# Patient Record
Sex: MALE | Race: WHITE | URBAN - METROPOLITAN AREA
[De-identification: names, ages, dates, MRNs, and addresses within clinical notes are randomized per-mention and may not be internally consistent; named-entity substitution may affect disease eponyms.]

---

## 2024-03-14 ENCOUNTER — APPOINTMENT (OUTPATIENT)
Dept: PRIMARY CARE | Facility: CLINIC | Age: 24
End: 2024-03-14
Payer: COMMERCIAL

## 2024-03-18 ENCOUNTER — OFFICE VISIT (OUTPATIENT)
Dept: PRIMARY CARE | Facility: CLINIC | Age: 24
End: 2024-03-18
Payer: COMMERCIAL

## 2024-03-18 VITALS
TEMPERATURE: 97.6 F | SYSTOLIC BLOOD PRESSURE: 112 MMHG | HEART RATE: 85 BPM | DIASTOLIC BLOOD PRESSURE: 70 MMHG | RESPIRATION RATE: 18 BRPM | BODY MASS INDEX: 35.09 KG/M2 | WEIGHT: 273.4 LBS | OXYGEN SATURATION: 97 % | HEIGHT: 74 IN

## 2024-03-18 DIAGNOSIS — K21.9 GASTROESOPHAGEAL REFLUX DISEASE WITHOUT ESOPHAGITIS: ICD-10-CM

## 2024-03-18 DIAGNOSIS — G47.30 SLEEP APNEA IN ADULT: ICD-10-CM

## 2024-03-18 DIAGNOSIS — R06.83 SNORING: ICD-10-CM

## 2024-03-18 DIAGNOSIS — Z00.00 WELLNESS EXAMINATION: Primary | ICD-10-CM

## 2024-03-18 PROCEDURE — 99385 PREV VISIT NEW AGE 18-39: CPT | Performed by: NURSE PRACTITIONER

## 2024-03-18 PROCEDURE — 1036F TOBACCO NON-USER: CPT | Performed by: NURSE PRACTITIONER

## 2024-03-18 RX ORDER — PANTOPRAZOLE SODIUM 40 MG/1
40 TABLET, DELAYED RELEASE ORAL
Qty: 42 TABLET | Refills: 0 | Status: SHIPPED | OUTPATIENT
Start: 2024-03-18 | End: 2024-04-29 | Stop reason: SDUPTHER

## 2024-03-18 ASSESSMENT — PATIENT HEALTH QUESTIONNAIRE - PHQ9
SUM OF ALL RESPONSES TO PHQ9 QUESTIONS 1 AND 2: 2
1. LITTLE INTEREST OR PLEASURE IN DOING THINGS: SEVERAL DAYS
10. IF YOU CHECKED OFF ANY PROBLEMS, HOW DIFFICULT HAVE THESE PROBLEMS MADE IT FOR YOU TO DO YOUR WORK, TAKE CARE OF THINGS AT HOME, OR GET ALONG WITH OTHER PEOPLE: SOMEWHAT DIFFICULT
2. FEELING DOWN, DEPRESSED OR HOPELESS: SEVERAL DAYS

## 2024-03-18 ASSESSMENT — ENCOUNTER SYMPTOMS
SHORTNESS OF BREATH: 0
VOMITING: 0
CONSTIPATION: 0
FEVER: 0
PALPITATIONS: 0
HEADACHES: 1
DIARRHEA: 0
FATIGUE: 0
WEAKNESS: 0
ABDOMINAL PAIN: 0
BLOOD IN STOOL: 0
COUGH: 0
NAUSEA: 0

## 2024-03-18 NOTE — PROGRESS NOTES
"Subjective   Patient ID: Kyle Lira is a 23 y.o. male who presents for Annual Exam (23 year annual exam. Concerns include heartburn and sleeping issues. ).    HPI Works selling heating/cooling components.  Dating his girlfriend for 6 years, since age 17.   Denies feelings of persistent depression. Denies anxiety.   Sees dentist regularly.   Wears glasses. Last eye doctor 6/2023  Admits to 2-3 headaches per week,states they often resolve after he eats. States he doesn't eat meals on a regular schedule and always skips breakfast and often skips either lung or dinner, stating he isn't very hungry until around noon. They order takeout 2-3 times per week. Eats a significant amount of pasta. He also eats a lot of spicy food. His weight has been stable, and he does do heavy lifting at work.  Review of Systems   Constitutional:  Negative for fatigue and fever.   Respiratory:  Negative for cough and shortness of breath.    Cardiovascular:  Negative for chest pain and palpitations.   Gastrointestinal:  Negative for abdominal pain, blood in stool, constipation, diarrhea, nausea and vomiting.   Genitourinary:  Negative for penile discharge, penile pain, penile swelling, scrotal swelling and testicular pain.   Neurological:  Positive for headaches (2-3 times per week. Sometimes goes away after eating. They are not debiliting..). Negative for weakness.   Objective   /70   Pulse 85   Temp 36.4 °C (97.6 °F)   Resp 18   Ht 1.88 m (6' 2\")   Wt 124 kg (273 lb 6.4 oz)   SpO2 97%   BMI 35.10 kg/m²   Physical Exam  Vitals reviewed.   Constitutional:       Appearance: Normal appearance.   HENT:      Head: Normocephalic.   Eyes:      Conjunctiva/sclera: Conjunctivae normal.   Cardiovascular:      Rate and Rhythm: Normal rate and regular rhythm.      Pulses: Normal pulses.   Pulmonary:      Breath sounds: Normal breath sounds.   Abdominal:      General: Bowel sounds are normal.      Palpations: Abdomen is soft. "   Musculoskeletal:      Cervical back: Neck supple.   Skin:     General: Skin is warm and dry.   Neurological:      General: No focal deficit present.      Mental Status: He is alert and oriented to person, place, and time.   Psychiatric:         Mood and Affect: Mood normal.         Thought Content: Thought content normal.     Assessment/Plan      Problem List Items Addressed This Visit       Gastroesophageal reflux disease without esophagitis - Primary    Relevant Medications    pantoprazole (Protonix) 40 mg EC tablet    Snoring    Relevant Orders    Referral to Adult Sleep Medicine    Sleep apnea in adult    Relevant Orders    Referral to Adult Sleep Medicine     Other Visit Diagnoses       Wellness examination        Relevant Orders    CBC    Thyroid Stimulating Hormone    Lipid Panel    Hemoglobin A1C    Comprehensive Metabolic Panel    Vitamin B12    Vitamin D 1,25 Dihydroxy (for eval of hypercalcemia)

## 2024-03-20 ENCOUNTER — LAB (OUTPATIENT)
Dept: LAB | Facility: LAB | Age: 24
End: 2024-03-20
Payer: COMMERCIAL

## 2024-03-20 DIAGNOSIS — Z00.00 WELLNESS EXAMINATION: ICD-10-CM

## 2024-03-20 LAB
ALBUMIN SERPL BCP-MCNC: 4.8 G/DL (ref 3.4–5)
ALP SERPL-CCNC: 58 U/L (ref 33–120)
ALT SERPL W P-5'-P-CCNC: 42 U/L (ref 10–52)
ANION GAP SERPL CALC-SCNC: 13 MMOL/L (ref 10–20)
AST SERPL W P-5'-P-CCNC: 25 U/L (ref 9–39)
BILIRUB SERPL-MCNC: 1.3 MG/DL (ref 0–1.2)
BUN SERPL-MCNC: 11 MG/DL (ref 6–23)
CALCIUM SERPL-MCNC: 9.6 MG/DL (ref 8.6–10.3)
CHLORIDE SERPL-SCNC: 104 MMOL/L (ref 98–107)
CHOLEST SERPL-MCNC: 145 MG/DL (ref 0–199)
CHOLESTEROL/HDL RATIO: 3.2
CO2 SERPL-SCNC: 28 MMOL/L (ref 21–32)
CREAT SERPL-MCNC: 0.91 MG/DL (ref 0.5–1.3)
EGFRCR SERPLBLD CKD-EPI 2021: >90 ML/MIN/1.73M*2
ERYTHROCYTE [DISTWIDTH] IN BLOOD BY AUTOMATED COUNT: 12.4 % (ref 11.5–14.5)
EST. AVERAGE GLUCOSE BLD GHB EST-MCNC: 108 MG/DL
GLUCOSE SERPL-MCNC: 93 MG/DL (ref 74–99)
HBA1C MFR BLD: 5.4 %
HCT VFR BLD AUTO: 51 % (ref 41–52)
HDLC SERPL-MCNC: 45.5 MG/DL
HGB BLD-MCNC: 17.4 G/DL (ref 13.5–17.5)
LDLC SERPL CALC-MCNC: 71 MG/DL
MCH RBC QN AUTO: 30.4 PG (ref 26–34)
MCHC RBC AUTO-ENTMCNC: 34.1 G/DL (ref 32–36)
MCV RBC AUTO: 89 FL (ref 80–100)
NON HDL CHOLESTEROL: 100 MG/DL (ref 0–149)
NRBC BLD-RTO: 0 /100 WBCS (ref 0–0)
PLATELET # BLD AUTO: 286 X10*3/UL (ref 150–450)
POTASSIUM SERPL-SCNC: 4.7 MMOL/L (ref 3.5–5.3)
PROT SERPL-MCNC: 7.8 G/DL (ref 6.4–8.2)
RBC # BLD AUTO: 5.73 X10*6/UL (ref 4.5–5.9)
SODIUM SERPL-SCNC: 140 MMOL/L (ref 136–145)
TRIGL SERPL-MCNC: 142 MG/DL (ref 0–149)
TSH SERPL-ACNC: 1.04 MIU/L (ref 0.44–3.98)
VIT B12 SERPL-MCNC: 281 PG/ML (ref 211–911)
VLDL: 28 MG/DL (ref 0–40)
WBC # BLD AUTO: 7 X10*3/UL (ref 4.4–11.3)

## 2024-03-20 PROCEDURE — 80061 LIPID PANEL: CPT

## 2024-03-20 PROCEDURE — 83036 HEMOGLOBIN GLYCOSYLATED A1C: CPT

## 2024-03-20 PROCEDURE — 80053 COMPREHEN METABOLIC PANEL: CPT

## 2024-03-20 PROCEDURE — 36415 COLL VENOUS BLD VENIPUNCTURE: CPT

## 2024-03-20 PROCEDURE — 82652 VIT D 1 25-DIHYDROXY: CPT

## 2024-03-20 PROCEDURE — 85027 COMPLETE CBC AUTOMATED: CPT

## 2024-03-20 PROCEDURE — 82607 VITAMIN B-12: CPT

## 2024-03-20 PROCEDURE — 84443 ASSAY THYROID STIM HORMONE: CPT

## 2024-03-22 LAB — 1,25(OH)2D3 SERPL-MCNC: 35.8 PG/ML (ref 19.9–79.3)

## 2024-04-29 ENCOUNTER — OFFICE VISIT (OUTPATIENT)
Dept: PRIMARY CARE | Facility: CLINIC | Age: 24
End: 2024-04-29
Payer: COMMERCIAL

## 2024-04-29 VITALS
RESPIRATION RATE: 20 BRPM | BODY MASS INDEX: 34.79 KG/M2 | TEMPERATURE: 97.7 F | SYSTOLIC BLOOD PRESSURE: 110 MMHG | WEIGHT: 271 LBS | DIASTOLIC BLOOD PRESSURE: 68 MMHG | HEART RATE: 76 BPM

## 2024-04-29 DIAGNOSIS — K21.9 GASTROESOPHAGEAL REFLUX DISEASE WITHOUT ESOPHAGITIS: Primary | ICD-10-CM

## 2024-04-29 DIAGNOSIS — R06.83 SNORING: ICD-10-CM

## 2024-04-29 DIAGNOSIS — G47.30 SLEEP APNEA IN ADULT: ICD-10-CM

## 2024-04-29 DIAGNOSIS — E66.9 OBESITY (BMI 30.0-34.9): ICD-10-CM

## 2024-04-29 PROBLEM — E66.811 OBESITY (BMI 30.0-34.9): Status: ACTIVE | Noted: 2024-04-29

## 2024-04-29 PROCEDURE — 99214 OFFICE O/P EST MOD 30 MIN: CPT | Performed by: NURSE PRACTITIONER

## 2024-04-29 RX ORDER — PANTOPRAZOLE SODIUM 40 MG/1
40 TABLET, DELAYED RELEASE ORAL
Qty: 30 TABLET | Refills: 11 | Status: SHIPPED | OUTPATIENT
Start: 2024-04-29 | End: 2024-04-29 | Stop reason: ENTERED-IN-ERROR

## 2024-04-29 RX ORDER — FAMOTIDINE 20 MG/1
20 TABLET, FILM COATED ORAL 2 TIMES DAILY
Qty: 60 TABLET | Refills: 5 | Status: SHIPPED | OUTPATIENT
Start: 2024-04-29 | End: 2024-10-26

## 2024-04-29 NOTE — ASSESSMENT & PLAN NOTE
Pt. Requesting assistance with weight loss. He admits that he never feels full. He states he always feels hungry and snacks late at night, often with mac and cheese or a pop tart. 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

## 2024-04-29 NOTE — ASSESSMENT & PLAN NOTE
Has appt with sleep medicine in late June, 2024. He has not been formally diagnosed with sleep apnea. He does report that he does not feel rested when he wakes in the morning.

## 2024-04-29 NOTE — ASSESSMENT & PLAN NOTE
Not been confirmed by sleep study, but reports waking up gasping for air. Had several similar instances and one that occurred while awake playing video games.   Has appt w/ sleep medicine 6/2024.

## 2024-04-29 NOTE — PROGRESS NOTES
Subjective   Kyle Lira is a 23 y.o. male who presents for Follow-up (6wk follow up for pantoprazole. Pt says its working. It hasn't completely stopped the heartburn but it helps a lot. ).  HPI Pt states his last episode of heartburn was 5 days ago and occurred while he was sleeping.  He did not take any additional medication and states it went away on it's own. He states symptoms are worse on an empty stomach and improve after he eats. He doesn't drink alcohol very often.   Review of Systems  Objective   Physical Exam  Vitals reviewed.   Constitutional:       General: He is not in acute distress.     Appearance: Normal appearance. He is obese. He is not ill-appearing.   HENT:      Head: Normocephalic.   Eyes:      Conjunctiva/sclera: Conjunctivae normal.   Cardiovascular:      Rate and Rhythm: Normal rate and regular rhythm.      Pulses: Normal pulses.   Pulmonary:      Breath sounds: Normal breath sounds.   Abdominal:      General: Bowel sounds are normal.      Palpations: Abdomen is soft.   Musculoskeletal:      Cervical back: Neck supple.   Skin:     General: Skin is warm and dry.   Neurological:      General: No focal deficit present.      Mental Status: He is alert and oriented to person, place, and time.   Psychiatric:         Mood and Affect: Mood normal.         Thought Content: Thought content normal.     /68   Pulse 76   Temp 36.5 °C (97.7 °F)   Resp 20   Wt 123 kg (271 lb)   BMI 34.79 kg/m²   Assessment/Plan   Problem List Items Addressed This Visit       Gastroesophageal reflux disease without esophagitis - Primary    Relevant Medications    famotidine (Pepcid) 20 mg tablet    Snoring    Current Assessment & Plan     Has appt with sleep medicine in late June, 2024. He has not been formally diagnosed with sleep apnea. He does report that he does not feel rested when he wakes in the morning.          Sleep apnea in adult    Current Assessment & Plan     Not been confirmed by sleep study, but  reports waking up gasping for air. Had several similar instances and one that occurred while awake playing video games.   Has appt w/ sleep medicine 6/2024.          Obesity (BMI 30.0-34.9)    Current Assessment & Plan     Pt. Requesting assistance with weight loss. He admits that he never feels full. He states he always feels hungry and snacks late at night, often with mac and cheese or a pop tart. 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          Relevant Orders    Follow Up In [facility] - Pharmacy    Referral to Nutrition Services

## 2024-05-15 ENCOUNTER — TELEMEDICINE (OUTPATIENT)
Dept: PHARMACY | Facility: HOSPITAL | Age: 24
End: 2024-05-15
Payer: COMMERCIAL

## 2024-05-15 DIAGNOSIS — E66.9 OBESITY (BMI 30.0-34.9): ICD-10-CM

## 2024-05-15 NOTE — PROGRESS NOTES
Subjective   Patient ID: Kyle Lira is a 23 y.o. male who presents for weight loss.    Referring Provider: ELENO Oreilly-CNP     HPI  Starting weight 271lb  BMI 35  Patient also has PMH of sleep apnea.    Review of Systems    Objective     Labs  Lab Results   Component Value Date    BILITOT 1.3 (H) 03/20/2024    CALCIUM 9.6 03/20/2024    CO2 28 03/20/2024     03/20/2024    CREATININE 0.91 03/20/2024    GLUCOSE 93 03/20/2024    ALKPHOS 58 03/20/2024    K 4.7 03/20/2024    PROT 7.8 03/20/2024     03/20/2024    AST 25 03/20/2024    ALT 42 03/20/2024    BUN 11 03/20/2024    ANIONGAP 13 03/20/2024    ALBUMIN 4.8 03/20/2024     Lab Results   Component Value Date    TRIG 142 03/20/2024    CHOL 145 03/20/2024    LDLCALC 71 03/20/2024    HDL 45.5 03/20/2024     Lab Results   Component Value Date    HGBA1C 5.4 03/20/2024     Assessment/Plan   Patient's insurance excludes Wegovy and Zepbound. Ozempic and Mounjaro need a PA. Will submit PA and follow up in 1 week with insurance determination.    PLAN:  Follow up in 1 week    Continue all meds under the continuation of care with the referring provider and clinical pharmacy team.    Rose Contreras, PharmD

## 2024-05-22 ENCOUNTER — TELEMEDICINE (OUTPATIENT)
Dept: PHARMACY | Facility: HOSPITAL | Age: 24
End: 2024-05-22
Payer: COMMERCIAL

## 2024-05-22 DIAGNOSIS — E66.9 OBESITY (BMI 30.0-34.9): ICD-10-CM

## 2024-05-24 NOTE — PROGRESS NOTES
Subjective   Patient ID: Kyle Lira is a 23 y.o. male who presents for weight loss.    Referring Provider: ELENO Oreilly-CNP     HPI  Starting weight 271lb  BMI 35  Patient also has PMH of sleep apnea.    Review of Systems    Objective     Labs  Lab Results   Component Value Date    BILITOT 1.3 (H) 03/20/2024    CALCIUM 9.6 03/20/2024    CO2 28 03/20/2024     03/20/2024    CREATININE 0.91 03/20/2024    GLUCOSE 93 03/20/2024    ALKPHOS 58 03/20/2024    K 4.7 03/20/2024    PROT 7.8 03/20/2024     03/20/2024    AST 25 03/20/2024    ALT 42 03/20/2024    BUN 11 03/20/2024    ANIONGAP 13 03/20/2024    ALBUMIN 4.8 03/20/2024     Lab Results   Component Value Date    TRIG 142 03/20/2024    CHOL 145 03/20/2024    LDLCALC 71 03/20/2024    HDL 45.5 03/20/2024     Lab Results   Component Value Date    HGBA1C 5.4 03/20/2024     Assessment/Plan   Patient's Ozempic PA was denied. Will attempt an appeal and follow up in 3 weeks with determination.    PLAN:  Follow up in 3 weeks    Continue all meds under the continuation of care with the referring provider and clinical pharmacy team.    Rose Contreras, PharmD

## 2024-06-12 ENCOUNTER — APPOINTMENT (OUTPATIENT)
Dept: PHARMACY | Facility: HOSPITAL | Age: 24
End: 2024-06-12
Payer: COMMERCIAL

## 2024-06-13 ENCOUNTER — TELEPHONE (OUTPATIENT)
Dept: PRIMARY CARE | Facility: CLINIC | Age: 24
End: 2024-06-13
Payer: COMMERCIAL

## 2024-06-13 DIAGNOSIS — K21.9 GASTROESOPHAGEAL REFLUX DISEASE WITHOUT ESOPHAGITIS: Primary | ICD-10-CM

## 2024-06-13 NOTE — TELEPHONE ENCOUNTER
PT needs a refill- PT wants to know if her can switch back to...  pantoprazole (Protonix) 40 mg EC tablet [[id number]]  DISCONTINUED   PT states this one works better.    [facility]    Thank you

## 2024-06-19 NOTE — PROGRESS NOTES
Patient: Kyle Lira  : 2000 AGE: 23 y.o. SEX:male   MRN: [id number]   Provider: MARGARITO Carter     Location [facility]   Service Date: 2024     PCP: MARGARITO Oreilly   Referred by: Domi Schmidt APRN-*          [facility]  New Visit Note      HISTORY OF PRESENT ILLNESS     Kyle Lira is a 23 y.o. male with a h/o Obesity and GERD  who presents to [facility].    2024 : NPV, referred by PCP with concerns of GWEN evaluation. Patient reports loud snoring, witnessed apneas, occasional gasping/choking for air, wakes unrefreshed, morning dry mouth, and morning sore throat. Report bothersome acid reflex, likes to eat spicy foods. Girlfriend is bothered by his snoring. ---> HSAT ordered.    SLEEP STUDY HISTORY (personally reviewed raw data such as interpretation report, data sheet, hypnogram, and titration table if available and applicable)  - Has never had sleep study completed     SLEEP-WAKE SCHEDULE    Sleep Patterns: He does have a usual bed partner. In terms of the patient's sleep/wake cycle, he generally gets into bed at approximately 11 PM.  his latency to sleep onset after lights out is usually <5 min. During the night, the patient generally awakens 0-1 times nightly. These awakenings are usually brief in duration but sometimes will be prolonged and difficulty falling back asleep. Final wake time on weekend mornings is around 7 AM.    Compared to weekdays (work week), the patient's sleep schedule is  different on the weekends (off work). Usual bed time on the weekend is around 1 AM. Final wake time on weekend mornings is around 10 AM.    Breathing during sleep: snoring, witnessed apneas, gasping/choking for air, nasal congestion, and nocturnal reflux symptoms  Behaviors at night: No   Sleep paralysis: No   Hypnogogic or hypnopompic hallucinations: No   Cataplexy: No     Leg symptoms and timing:  -  Add 47304 Cpt? (Important Note: In 2017 The Use Of 64303 Is Being Tracked By Cms To Determine Future Global Period Reimbursement For Global Periods): no Sensations: Patient does not have unusual sensations in their extremities that cause an urge to move them     Daytime Symptoms:  On awakening patient reports: wake unrefreshed, feels sleepy, morning dry mouth, morning sore throat, and hard to get out of bed  Patient report some daytime symptoms including: DAYTIME SYMPTOMS: reports sleep inertia late to work/school due to sleepiness  Patient denies daytime symptoms including: Denies: excessive daytime sleepiness irritability during the day difficulty with memory or concentration during the day feeling sleepy when driving    Sleep environment:  Preferred sleep position: side , wakes on back or stomach   Room is dark: Yes  Room is quiet: Yes  Room is cool: Yes  Bed comfort: good    SLEEP HABITS  Caffeine consumption: Yes, 2 cups/day  Alcohol consumption: Yes, rarely (occasional)  Smoking: Yes, vaping nicotine   Marijuana: No  Sleep aids: denies     WEIGHT: stable    ESS: 2  SILVIANO: 8  STOP-BAN (high)    REVIEW OF SYSTEMS     All other systems have been reviewed and are negative.    ALLERGIES     No Known Allergies    MEDICATIONS     Current Outpatient Medications   Medication Sig Dispense Refill    famotidine (Pepcid) 20 mg tablet Take 1 tablet (20 mg) by mouth 2 times a day. (Patient not taking: Reported on 2024) 60 tablet 5     No current facility-administered medications for this visit.       PAST HISTORIES     PERTINENT PAST MEDICAL HISTORY: See HPI    PERTINENT PAST SURGICAL HISTORY for Sleep Medicine:  non-contributory    PERTINENT FAMILY HISTORY for Sleep Medicine:  loud snoring- mother     PERTINENT SOCIAL HISTORY:  He  reports that he has never smoked. He has never used smokeless tobacco. He reports that he does not currently use alcohol. He reports that he does not currently use drugs. 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 No significant changes unless documented in the pertinent chart section. Updates made when necessary.     PHYSICAL EXAM     VITAL SIGNS: /83   Pulse 105   Resp 18   Ht 1.88 m (6' 2\")   Wt 122 kg (268 lb 1.6 oz)   SpO2 97%   BMI 34.42 kg/m²     NECK CIRCUMFERENCE: 47cm    CURRENT WEIGHT:   Vitals:    06/27/24 1411   Weight: 122 kg (268 lb 1.6 oz)      PREVIOUS WEIGHTS:  Wt Readings from Last 3 Encounters:   06/27/24 122 kg (268 lb 1.6 oz)   04/29/24 123 kg (271 lb)   03/18/24 124 kg (273 lb 6.4 oz)     Physical Exam  Constitutional: Awake, not in distress  Lungs: Clear to auscultation bilateral, no cough noted  Heart: Regular rate and rhythm  Skin: Warm, no rash  Neuro: No tremors, moves all extremities  Psych: alert and oriented to time, place, and person    HEENT:   Tonsils enlargement grade 1+   Airway comments: narrow lateral walls   Tongue scalloping: yes   Modified Mallampati score - 3    RESULTS/DATA     No results found for: \"IRON\", \"TRANSFERRIN\", \"IRONSAT\", \"TIBC\", \"FERRITIN\"    Bicarbonate   Date Value Ref Range Status   03/20/2024 28 21 - 32 mmol/L Final       ASSESSMENT/PLAN     Mr. Lira is a 23 y.o. male and He was referred to the [facility] for evaluation of possible sleep disordered breathing    Problem List, Orders, Assessment, Recommendations:    #Sleep disordered breathing/suspected GWEN  - Due to high pre-test probability without significant medical comorbidity or possible concomitant sleep disorder, I recommend home sleep apnea testing to evaluate for GWEN  - Follow up after sleep study to review results and determine plan of care (if normal, would likely proceed with in lab PSG)   -Diet, exercise, and weight loss were emphasized today in clinic, as were non-supine sleep, avoiding alcohol in the late evening, and driving or operating heavy machinery when sleepy.     #GERD  - uncontrolled, educated on GERD precautions  - was previously " on PPI per PCP which worked well but PCP discontinued and referred him to GI?  - defer medication management      #Obesity  BMI Readings from Last 1 Encounters:   06/27/24 34.42 kg/m²     - Encouraged healthy weight loss via diet and exercise  - Weight loss can help in the long term treatment of GWEN.  - Defer management to PCP     #Vaping nicotine  - daily use, encouraged smoking cessation       All of patient's questions were answered. He verbalizes understanding and agreement with my assessment and plan.    Disposition    Return to clinic in 1-1.5  months    Detail Level: Detailed

## 2024-06-26 ENCOUNTER — APPOINTMENT (OUTPATIENT)
Dept: PHARMACY | Facility: HOSPITAL | Age: 24
End: 2024-06-26
Payer: COMMERCIAL

## 2024-06-26 DIAGNOSIS — E66.9 OBESITY (BMI 30.0-34.9): ICD-10-CM

## 2024-06-26 PROBLEM — G47.30 SLEEP DISORDER BREATHING: Status: ACTIVE | Noted: 2024-06-26

## 2024-06-27 ENCOUNTER — APPOINTMENT (OUTPATIENT)
Dept: SLEEP MEDICINE | Facility: CLINIC | Age: 24
End: 2024-06-27
Payer: COMMERCIAL

## 2024-06-27 VITALS
OXYGEN SATURATION: 97 % | HEART RATE: 105 BPM | HEIGHT: 74 IN | RESPIRATION RATE: 18 BRPM | DIASTOLIC BLOOD PRESSURE: 83 MMHG | SYSTOLIC BLOOD PRESSURE: 121 MMHG | WEIGHT: 268.1 LBS | BODY MASS INDEX: 34.41 KG/M2

## 2024-06-27 DIAGNOSIS — F17.290 VAPING NICOTINE DEPENDENCE, TOBACCO PRODUCT: ICD-10-CM

## 2024-06-27 DIAGNOSIS — G47.30 SLEEP DISORDER BREATHING: Primary | ICD-10-CM

## 2024-06-27 DIAGNOSIS — K21.9 GASTROESOPHAGEAL REFLUX DISEASE WITHOUT ESOPHAGITIS: ICD-10-CM

## 2024-06-27 DIAGNOSIS — G47.30 SLEEP APNEA IN ADULT: ICD-10-CM

## 2024-06-27 DIAGNOSIS — E66.09 CLASS 1 OBESITY DUE TO EXCESS CALORIES WITH BODY MASS INDEX (BMI) OF 34.0 TO 34.9 IN ADULT, UNSPECIFIED WHETHER SERIOUS COMORBIDITY PRESENT: ICD-10-CM

## 2024-06-27 DIAGNOSIS — R06.83 SNORING: ICD-10-CM

## 2024-06-27 PROBLEM — E66.811 CLASS 1 OBESITY DUE TO EXCESS CALORIES WITH BODY MASS INDEX (BMI) OF 34.0 TO 34.9 IN ADULT: Status: ACTIVE | Noted: 2024-06-27

## 2024-06-27 PROCEDURE — 99204 OFFICE O/P NEW MOD 45 MIN: CPT | Performed by: NURSE PRACTITIONER

## 2024-06-27 PROCEDURE — 3008F BODY MASS INDEX DOCD: CPT | Performed by: NURSE PRACTITIONER

## 2024-06-27 PROCEDURE — 1036F TOBACCO NON-USER: CPT | Performed by: NURSE PRACTITIONER

## 2024-06-27 ASSESSMENT — SLEEP AND FATIGUE QUESTIONNAIRES
HOW LIKELY ARE YOU TO NOD OFF OR FALL ASLEEP WHILE SITTING AND READING: SLIGHT CHANCE OF DOZING
HOW LIKELY ARE YOU TO NOD OFF OR FALL ASLEEP WHILE SITTING AND TALKING TO SOMEONE: WOULD NEVER DOZE
HOW LIKELY ARE YOU TO NOD OFF OR FALL ASLEEP WHEN YOU ARE A PASSENGER IN A CAR FOR AN HOUR WITHOUT A BREAK: SLIGHT CHANCE OF DOZING
HOW LIKELY ARE YOU TO NOD OFF OR FALL ASLEEP WHILE WATCHING TV: WOULD NEVER DOZE
HOW LIKELY ARE YOU TO NOD OFF OR FALL ASLEEP WHILE LYING DOWN TO REST IN THE AFTERNOON WHEN CIRCUMSTANCES PERMIT: WOULD NEVER DOZE
WORRIED_DISTRESSED_DUE_TO_SLEEP: SOMEWHAT
SLEEP_PROBLEM_NOTICEABLE_TO_OTHERS: A LITTLE
ESS-CHAD TOTAL SCORE: 2
SITING INACTIVE IN A PUBLIC PLACE LIKE A CLASS ROOM OR A MOVIE THEATER: WOULD NEVER DOZE
DIFFICULTY_FALLING_ASLEEP: MILD
HOW LIKELY ARE YOU TO NOD OFF OR FALL ASLEEP WHILE SITTING QUIETLY AFTER LUNCH WITHOUT ALCOHOL: WOULD NEVER DOZE
DIFFICULTY_STAYING_ASLEEP: MILD
HOW LIKELY ARE YOU TO NOD OFF OR FALL ASLEEP IN A CAR, WHILE STOPPED FOR A FEW MINUTES IN TRAFFIC: WOULD NEVER DOZE
SLEEP_PROBLEM_INTERFERES_DAILY_ACTIVITIES: A LITTLE
SATISFACTION_WITH_CURRENT_SLEEP_PATTERN: SATISFIED

## 2024-06-27 ASSESSMENT — COLUMBIA-SUICIDE SEVERITY RATING SCALE - C-SSRS
1. IN THE PAST MONTH, HAVE YOU WISHED YOU WERE DEAD OR WISHED YOU COULD GO TO SLEEP AND NOT WAKE UP?: NO
2. HAVE YOU ACTUALLY HAD ANY THOUGHTS OF KILLING YOURSELF?: NO
6. HAVE YOU EVER DONE ANYTHING, STARTED TO DO ANYTHING, OR PREPARED TO DO ANYTHING TO END YOUR LIFE?: NO

## 2024-06-27 ASSESSMENT — PATIENT HEALTH QUESTIONNAIRE - PHQ9
2. FEELING DOWN, DEPRESSED OR HOPELESS: NOT AT ALL
1. LITTLE INTEREST OR PLEASURE IN DOING THINGS: NOT AT ALL
SUM OF ALL RESPONSES TO PHQ9 QUESTIONS 1 AND 2: 0

## 2024-06-27 ASSESSMENT — ENCOUNTER SYMPTOMS
OCCASIONAL FEELINGS OF UNSTEADINESS: 0
DEPRESSION: 0
LOSS OF SENSATION IN FEET: 0

## 2024-06-27 NOTE — PATIENT INSTRUCTIONS
[facility]   [facility]  [address] [address]  [phone]       NAME: Kyle Lira   DATE: 06/27/24    Your Sleep Provider Today: MARGARITO Carter  Your Primary Care Physician: MARGARITO Oreilly       DIAGNOSIS:   1. Sleep disorder breathing        2. Snoring  Referral to Adult Sleep Medicine      3. Sleep apnea in adult  Referral to Adult Sleep Medicine          Thank you for coming to the Sleep Medicine Clinic today! Your sleep medicine provider today was: MARGARITO Carter Below is a summary of your treatment plan, other important information, and our contact numbers:      TREATMENT PLAN     - Get your sleep study scheduled  - Follow-up in 1-1.5 months.  - If not already done, sign up for 'My Chart' and send prescription requests or messages through this    Scheduling a Sleep Study    Your provider ordered a sleep apnea test today. It will usually take 2 - 3 business days for Insurance to approve the order.     Once you test is approved it will be sent to the ordering sleep lab. When the sleep lab is notified of the new order, they will reach out to you to get you scheduled for an in-lab sleep study or to get you scheduled for a pick-up date for your Home Sleep Study Kit (depending on which type of study your provider recommended).    They usually will reach out to you about 1 week after your test has been ordered. Please contact the office at [phone] if you have not heard back from them in 2 weeks after you have seen your provider. See below for list of sleep lab contact numbers, if needed.     Sleep Testing for sleep apnea: The best and ideal way to check out if you have sleep apnea is to do an overnight sleep study in the sleep laboratory. Alternatively, a home sleep apnea test can also be done depending on your insurance and risk factors.     If you are having a home sleep apnea test, kindly allot 1 hour  during pickup of the testing kit as you will have to complete paperwork and listen to the sleep technician for in-person on-the-spot demonstration and instructions on how to hook up the testing kit at home. Do the test for 1 day and start off with sleeping on your back. If you sleep on your side in the middle of night or you have always been a side or stomach-sleeper, it is ok as long as you have some time on your back and off-back.     If you are having an overnight in-lab sleep study, please make sure to bring toiletries, a comfy pillow, additional warm blankets, and any nighttime medications (include as-needed inhaler, pain pill, etc) that you may regularly take. Also, be sure to eat dinner before you arrive as we generally do not provide meals inside the sleep testing center. Lastly, in order to fall asleep faster in the sleep testing center, we advise patients to wake up 2 hours earlier on the morning of scheduled testing and avoid napping 2 days prior testing. Sometimes, your sleep provider may prescribe a sleep aid to be taken at lights out in the sleep testing center. If you are taking a sleep aid, consider having somebody pick you up after the sleep testing.    Overnight sleep studies may be scheduled on a weekday or weekend. We also perform daytime testing for shift workers on a case-by-case basis.      IMPORTANT INFORMATION     Call 911 for medical emergencies.  Our offices are generally open from Monday-Friday, 9 am - 5 pm.  If you need to get in touch with me, you may either call me/my team (number is below) or you can use [facility].  If a referral for a test, for CPAP, or for another specialist was made, and you have not heard about scheduling this within a week, please call scheduling at [phone]).  If you are unable to make your appointment for clinic or an overnight study, kindly call the office at least 48 hours in advance to cancel and reschedule.  If you are on CPAP, please bring your  "device's card or the device to each clinic appointment.   There are no supporting services by either the sleep doctors or their staff on weekends and Holidays, or after 5 PM on weekdays.     PRESCRIPTIONS     We require 7 days advanced notice for prescription refills. If we do not receive the request in this time, we cannot guarantee that your medication will be refilled in time.      IMPORTANT PHONE NUMBERS     [facility]: [phone]  [facility] (DME): [phone]  [facility] (DME): [phone]  [facility] (DME): [phone]    CONTACTING YOUR SLEEP MEDICINE PROVIDER AND SLEEP TEAM      For issues with your machine or mask interface, please call your DME provider first. [employer] stands for durable medical company. [employer] is the company who provides you the machine and/or PAP supplies / accessories.   To schedule, cancel, or reschedule SLEEP STUDY APPOINTMENTS, please call the Main Phone Line at [phone] - option 3.   To schedule, cancel, or reschedule CLINIC APPOINTMENTS, you can do it in \"[facility] Househart\", call [phone] for [facility] office , [phone] for [facility] office to speak with my on site staff, or call the Main Phone Line at [phone] - option 2  For CLINICAL QUESTIONS or MEDICATION REFILLS, please call direct line for Adult Sleep Nurses at [phone].   Lastly, you can also send a message directly to your provider through \"My Chart\", which is the email service through your  Records Account: [email or web address]     Adult Sleep Nurses (Michelle Sanders, ORI and Dee Chaidez RN):  For clinical questions and refilling prescriptions: [phone]  Email sleep diaries and other documents at: [email or web address]    Office locations for Alka Barrios NP:    [address]  [place] [address] [phone]    [address]  [address]  [place] [address] [phone] " [phone]      OUR SLEEP TESTING LOCATIONS     Our team will contact you to schedule your sleep study, however, you can contact us as follow:  Main Phone Line (scheduling only): [phone]), option 3    Sleep Testing Locations:   [place] (18 years and older): [address] [facility], 2nd floor   [place] (18 years and older): [address] [facility]; 4th floor  After hours line: [phone]  [facility] (18 years and older) at [place]: [address] [place] [address]  After hours line: [phone]   [facility] at [facility] (Main campus: All ages): [facility], 6th floor. After hours line: [phone]   [place] (5 years and older; younger considered on case-by-case basis): [address] [facility]; [address], [address]. Scheduling  After hours line: [phone]       Here at [facility], we wish you a restful sleep!    Your sleep medicine provider for this visit was: Alka Barrios, APRN-CNP

## 2024-07-03 NOTE — PROGRESS NOTES
Subjective   Patient ID: Kyle Lira is a 23 y.o. male who presents for weight loss.    Referring Provider: ELENO Oreilly-CNP     HPI  Starting weight 271lb  BMI 35  Patient also has PMH of sleep apnea.    Review of Systems    Objective     Labs  Lab Results   Component Value Date    BILITOT 1.3 (H) 03/20/2024    CALCIUM 9.6 03/20/2024    CO2 28 03/20/2024     03/20/2024    CREATININE 0.91 03/20/2024    GLUCOSE 93 03/20/2024    ALKPHOS 58 03/20/2024    K 4.7 03/20/2024    PROT 7.8 03/20/2024     03/20/2024    AST 25 03/20/2024    ALT 42 03/20/2024    BUN 11 03/20/2024    ANIONGAP 13 03/20/2024    ALBUMIN 4.8 03/20/2024     Lab Results   Component Value Date    TRIG 142 03/20/2024    CHOL 145 03/20/2024    LDLCALC 71 03/20/2024    HDL 45.5 03/20/2024     Lab Results   Component Value Date    HGBA1C 5.4 03/20/2024     Assessment/Plan   PA and appeal were both denied for patient. Insurance dose not cover any weight loss medications. Discussed cash price for medication, which patient said he was not willing to consider at this time. Contrave and Qsymia could be options for this patient. Will send to PCP for consideration at next visit.    PLAN:  Follow up as needed    Continue all meds under the continuation of care with the referring provider and clinical pharmacy team.    Rose Contreras, PharmD

## 2024-07-08 ENCOUNTER — LAB (OUTPATIENT)
Dept: LAB | Facility: LAB | Age: 24
End: 2024-07-08
Payer: COMMERCIAL

## 2024-07-08 DIAGNOSIS — K21.9 GASTROESOPHAGEAL REFLUX DISEASE WITHOUT ESOPHAGITIS: ICD-10-CM

## 2024-07-08 PROCEDURE — 83013 H PYLORI (C-13) BREATH: CPT

## 2024-07-09 LAB — UREA BREATH TEST QL: NEGATIVE

## 2024-07-10 DIAGNOSIS — K21.9 GASTROESOPHAGEAL REFLUX DISEASE WITHOUT ESOPHAGITIS: Primary | ICD-10-CM

## 2024-07-10 RX ORDER — PANTOPRAZOLE SODIUM 40 MG/1
40 TABLET, DELAYED RELEASE ORAL DAILY
Qty: 30 TABLET | Refills: 5 | Status: SHIPPED | OUTPATIENT
Start: 2024-07-10 | End: 2025-01-06

## 2024-07-29 ENCOUNTER — APPOINTMENT (OUTPATIENT)
Dept: PRIMARY CARE | Facility: CLINIC | Age: 24
End: 2024-07-29
Payer: COMMERCIAL

## 2024-07-29 VITALS
HEIGHT: 74 IN | SYSTOLIC BLOOD PRESSURE: 100 MMHG | RESPIRATION RATE: 20 BRPM | BODY MASS INDEX: 33.5 KG/M2 | HEART RATE: 68 BPM | DIASTOLIC BLOOD PRESSURE: 74 MMHG | WEIGHT: 261 LBS | TEMPERATURE: 98.1 F

## 2024-07-29 DIAGNOSIS — G47.30 SLEEP APNEA IN ADULT: ICD-10-CM

## 2024-07-29 DIAGNOSIS — E66.9 OBESITY (BMI 30.0-34.9): ICD-10-CM

## 2024-07-29 DIAGNOSIS — K21.9 GASTROESOPHAGEAL REFLUX DISEASE WITHOUT ESOPHAGITIS: ICD-10-CM

## 2024-07-29 DIAGNOSIS — R61 HYPERHIDROSIS: ICD-10-CM

## 2024-07-29 DIAGNOSIS — F17.290 VAPING NICOTINE DEPENDENCE, TOBACCO PRODUCT: Primary | ICD-10-CM

## 2024-07-29 PROCEDURE — 3008F BODY MASS INDEX DOCD: CPT | Performed by: NURSE PRACTITIONER

## 2024-07-29 PROCEDURE — 99214 OFFICE O/P EST MOD 30 MIN: CPT | Performed by: NURSE PRACTITIONER

## 2024-07-29 PROCEDURE — 1036F TOBACCO NON-USER: CPT | Performed by: NURSE PRACTITIONER

## 2024-07-29 ASSESSMENT — ENCOUNTER SYMPTOMS
SHORTNESS OF BREATH: 0
DIAPHORESIS: 1
CHILLS: 0
NAUSEA: 0
FEVER: 0
WHEEZING: 0
VOMITING: 0
ABDOMINAL PAIN: 0
PALPITATIONS: 0

## 2024-07-29 NOTE — ASSESSMENT & PLAN NOTE
Does not want to start meds currently, but did discuss the role of topicals, anticholinergics, and injectable botox.

## 2024-07-29 NOTE — ASSESSMENT & PLAN NOTE
Has appointment with appt w/ GI Dr. Porras 9/16/2024. May need EGD. Describes having hiccup an belching, but no longer has burning sensation if he takes the Protonix, but it returns if he tries to stop the med.

## 2024-07-29 NOTE — ASSESSMENT & PLAN NOTE
He needs to do an HSAT, but states his appt w/ sleep medicine provider was cancelled by the provider and he does not yet have the equipment to do this test. Will message that office to try to make arrangements.

## 2024-07-29 NOTE — PROGRESS NOTES
"Brandi Lira is a 23 y.o. male who presents for Follow-up (3 month follow up for pantoprazole/GERD. Pt says its working well. He feels like it takes care of his symptoms but it didn't fix the problem. As soon as he stops it, the symptoms come back. /).  HPI  Review of Systems   Constitutional:  Positive for diaphoresis. Negative for chills and fever.   Respiratory:  Negative for shortness of breath and wheezing.    Cardiovascular:  Negative for chest pain, palpitations and leg swelling.   Gastrointestinal:  Negative for abdominal pain, nausea and vomiting.        Hiccups and belching, but no heartburn if he takes Protonix regularly.     Objective   Physical Exam  Constitutional:       Appearance: Normal appearance. He is obese. He is not ill-appearing.   Cardiovascular:      Rate and Rhythm: Normal rate.   Pulmonary:      Effort: Pulmonary effort is normal.   Skin:     General: Skin is warm and dry.   Neurological:      Mental Status: He is alert and oriented to person, place, and time.   Psychiatric:         Mood and Affect: Mood normal.       /74   Pulse 68   Temp 36.7 °C (98.1 °F)   Resp 20   Ht 1.88 m (6' 2\")   Wt 118 kg (261 lb)   BMI 33.51 kg/m²   Assessment/Plan   Problem List Items Addressed This Visit       Gastroesophageal reflux disease without esophagitis    Overview     Managed with Protonix 40 mg daily.         Current Assessment & Plan     Has appointment with appt w/ GI Dr. Porras 9/16/2024. May need EGD. Describes having hiccup an belching, but no longer has burning sensation if he takes the Protonix, but it returns if he tries to stop the med.          Sleep apnea in adult    Overview     Follows w/ Sleep Medicine, Alka Barrios, [place]         Current Assessment & Plan     He needs to do an HSAT, but states his appt w/ sleep medicine provider was cancelled by the provider and he does not yet have the equipment to do this test. Will message that office to try to make " arrangements.          Obesity (BMI 30.0-34.9)    Vaping nicotine dependence, tobacco product - Primary    Overview     !st vape around age 15, regular use at age 18.         Current Assessment & Plan     He is interested in quitting, but not motivated to attempt to quit.          Hyperhidrosis    Overview     Has had this since puberty, never treated.          Current Assessment & Plan     Does not want to start meds currently, but did discuss the role of topicals, anticholinergics, and injectable botox.          Follow-up in 3 months.

## 2024-08-02 DIAGNOSIS — K21.9 GASTROESOPHAGEAL REFLUX DISEASE WITHOUT ESOPHAGITIS: ICD-10-CM

## 2024-08-05 RX ORDER — PANTOPRAZOLE SODIUM 40 MG/1
40 TABLET, DELAYED RELEASE ORAL DAILY
Qty: 90 TABLET | Refills: 3 | Status: SHIPPED | OUTPATIENT
Start: 2024-08-05 | End: 2025-08-05

## 2024-08-16 ENCOUNTER — APPOINTMENT (OUTPATIENT)
Dept: SLEEP MEDICINE | Facility: CLINIC | Age: 24
End: 2024-08-16
Payer: COMMERCIAL

## 2024-09-16 ENCOUNTER — APPOINTMENT (OUTPATIENT)
Dept: GASTROENTEROLOGY | Facility: CLINIC | Age: 24
End: 2024-09-16
Payer: COMMERCIAL

## 2024-09-16 VITALS
TEMPERATURE: 98.7 F | HEIGHT: 74 IN | SYSTOLIC BLOOD PRESSURE: 113 MMHG | OXYGEN SATURATION: 98 % | HEART RATE: 64 BPM | WEIGHT: 251 LBS | BODY MASS INDEX: 32.21 KG/M2 | DIASTOLIC BLOOD PRESSURE: 62 MMHG

## 2024-09-16 DIAGNOSIS — K21.9 GASTROESOPHAGEAL REFLUX DISEASE WITHOUT ESOPHAGITIS: ICD-10-CM

## 2024-09-16 PROCEDURE — 3008F BODY MASS INDEX DOCD: CPT | Performed by: INTERNAL MEDICINE

## 2024-09-16 PROCEDURE — 1036F TOBACCO NON-USER: CPT | Performed by: INTERNAL MEDICINE

## 2024-09-16 PROCEDURE — 99204 OFFICE O/P NEW MOD 45 MIN: CPT | Performed by: INTERNAL MEDICINE

## 2024-09-16 ASSESSMENT — ENCOUNTER SYMPTOMS
CARDIOVASCULAR NEGATIVE: 1
RESPIRATORY NEGATIVE: 1
ENDOCRINE NEGATIVE: 1
CONSTITUTIONAL NEGATIVE: 1
NEUROLOGICAL NEGATIVE: 1

## 2024-09-16 NOTE — PATIENT INSTRUCTIONS
Gastroesophageal reflux disease without esophagitis  -     Referral to Gastroenterology  -     Esophagogastroduodenoscopy (EGD); Future  Not able to wean off the PPI.   He has changed his diet and has started to exercise. He has tried some lifestyle changes and it has not resolved his symptoms.

## 2024-09-16 NOTE — PROGRESS NOTES
"Subjective   Patient ID: Kyle Lira is a 23 y.o. male who presents for GERD (Patient presents for years of ongoing GERD. States he has been taking the Protonix daily with good results. /).  HPI  Patient is a 23-year-old overweight male.  He has a longstanding history of heartburn which he describes as acid sensation in his throat and chest.  He was started on PPI which has helped with the symptoms but not completely resolved.  He is also following lifestyle changes which has also not helped completely.  He is on Pantoprazole daily for 4 months.   Symptoms:   Heartburn on and off whole day.   The medicine helps >> when he tried to stop it . It got worse.     No Dysphagia.   NO abdominal; pain.   Current Outpatient Medications on File Prior to Visit   Medication Sig Dispense Refill    pantoprazole (ProtoNix) 40 mg EC tablet Take 1 tablet (40 mg) by mouth once daily. DO NOT CRUSH CHEW OR SPLIT 90 tablet 3     No current facility-administered medications on file prior to visit.        Review of Systems   Constitutional: Negative.    Respiratory: Negative.     Cardiovascular: Negative.    Endocrine: Negative.    Genitourinary: Negative.    Skin: Negative.    Neurological: Negative.        Objective   Physical Exam  Constitutional:       Appearance: Normal appearance.   HENT:      Head: Normocephalic and atraumatic.   Cardiovascular:      Rate and Rhythm: Normal rate and regular rhythm.   Pulmonary:      Effort: Pulmonary effort is normal.      Breath sounds: Normal breath sounds.   Abdominal:      General: Abdomen is flat. Bowel sounds are normal.      Palpations: Abdomen is soft.      Tenderness: There is no abdominal tenderness.   Musculoskeletal:         General: Normal range of motion.   Skin:     General: Skin is warm.   Neurological:      General: No focal deficit present.      Mental Status: He is alert.       /62   Pulse 64   Temp 37.1 °C (98.7 °F) (Temporal)   Ht 1.88 m (6' 2\")   Wt 114 kg (251 lb)   " "SpO2 98%   BMI 32.23 kg/m²      Lab Results   Component Value Date    WBC 7.0 03/20/2024    HGB 17.4 03/20/2024    HCT 51.0 03/20/2024    MCV 89 03/20/2024     03/20/2024           No lab exists for component: \"LABALBU\"    No results found for: \"AFP\"  Lab Results   Component Value Date    TSH 1.04 03/20/2024         Assessment/Plan   Diagnoses and all orders for this visit:  Gastroesophageal reflux disease without esophagitis  -     Referral to Gastroenterology  -     Esophagogastroduodenoscopy (EGD); Future    Not able to wean off the PPI.   He has changed his diet and has started to exercise. He has tried some lifestyle changes and it has not resolved his symptoms.   For further evaluation we will schedule him for upper endoscopy based on the results he may need twice a day PPI course.          "

## 2024-10-10 ENCOUNTER — ANESTHESIA EVENT (OUTPATIENT)
Dept: GASTROENTEROLOGY | Facility: EXTERNAL LOCATION | Age: 24
End: 2024-10-10

## 2024-10-21 ENCOUNTER — APPOINTMENT (OUTPATIENT)
Dept: GASTROENTEROLOGY | Facility: EXTERNAL LOCATION | Age: 24
End: 2024-10-21
Payer: COMMERCIAL

## 2024-10-21 ENCOUNTER — ANESTHESIA (OUTPATIENT)
Dept: GASTROENTEROLOGY | Facility: EXTERNAL LOCATION | Age: 24
End: 2024-10-21

## 2024-10-21 VITALS
SYSTOLIC BLOOD PRESSURE: 117 MMHG | TEMPERATURE: 98.1 F | OXYGEN SATURATION: 97 % | BODY MASS INDEX: 28.88 KG/M2 | DIASTOLIC BLOOD PRESSURE: 75 MMHG | HEART RATE: 62 BPM | HEIGHT: 74 IN | RESPIRATION RATE: 14 BRPM | WEIGHT: 225 LBS

## 2024-10-21 DIAGNOSIS — K21.9 GASTROESOPHAGEAL REFLUX DISEASE WITHOUT ESOPHAGITIS: Primary | ICD-10-CM

## 2024-10-21 DIAGNOSIS — K21.00 GASTROESOPHAGEAL REFLUX DISEASE WITH ESOPHAGITIS WITHOUT HEMORRHAGE: ICD-10-CM

## 2024-10-21 PROCEDURE — 43239 EGD BIOPSY SINGLE/MULTIPLE: CPT | Performed by: INTERNAL MEDICINE

## 2024-10-21 RX ORDER — LIDOCAINE HYDROCHLORIDE 20 MG/ML
INJECTION, SOLUTION INFILTRATION; PERINEURAL AS NEEDED
Status: DISCONTINUED | OUTPATIENT
Start: 2024-10-21 | End: 2024-10-21

## 2024-10-21 RX ORDER — PANTOPRAZOLE SODIUM 40 MG/1
40 TABLET, DELAYED RELEASE ORAL 2 TIMES DAILY
Qty: 60 TABLET | Refills: 3 | Status: SHIPPED | OUTPATIENT
Start: 2024-10-21

## 2024-10-21 RX ORDER — PROPOFOL 10 MG/ML
INJECTION, EMULSION INTRAVENOUS AS NEEDED
Status: DISCONTINUED | OUTPATIENT
Start: 2024-10-21 | End: 2024-10-21

## 2024-10-21 ASSESSMENT — ENCOUNTER SYMPTOMS
WHEEZING: 0
SPEECH DIFFICULTY: 0
ARTHRALGIAS: 0
NAUSEA: 0
VOMITING: 0
COUGH: 0
DIZZINESS: 0
TROUBLE SWALLOWING: 0
LIGHT-HEADEDNESS: 0
ABDOMINAL DISTENTION: 0
SHORTNESS OF BREATH: 0
CONFUSION: 0
FEVER: 0
UNEXPECTED WEIGHT CHANGE: 0
ABDOMINAL PAIN: 0
SLEEP DISTURBANCE: 0
DIARRHEA: 0
DIFFICULTY URINATING: 0
COLOR CHANGE: 0
CONSTIPATION: 0
CHILLS: 0
HEADACHES: 0
JOINT SWELLING: 0

## 2024-10-21 ASSESSMENT — COLUMBIA-SUICIDE SEVERITY RATING SCALE - C-SSRS
6. HAVE YOU EVER DONE ANYTHING, STARTED TO DO ANYTHING, OR PREPARED TO DO ANYTHING TO END YOUR LIFE?: NO
1. IN THE PAST MONTH, HAVE YOU WISHED YOU WERE DEAD OR WISHED YOU COULD GO TO SLEEP AND NOT WAKE UP?: NO
2. HAVE YOU ACTUALLY HAD ANY THOUGHTS OF KILLING YOURSELF?: NO

## 2024-10-21 ASSESSMENT — PAIN - FUNCTIONAL ASSESSMENT
PAIN_FUNCTIONAL_ASSESSMENT: 0-10

## 2024-10-21 ASSESSMENT — PAIN SCALES - GENERAL
PAINLEVEL_OUTOF10: 0 - NO PAIN
PAINLEVEL_OUTOF10: 0 - NO PAIN
PAIN_LEVEL: 0
PAINLEVEL_OUTOF10: 0 - NO PAIN
PAINLEVEL_OUTOF10: 0 - NO PAIN

## 2024-10-21 NOTE — ANESTHESIA PREPROCEDURE EVALUATION
Patient: Kyle Lira    Procedure Information       Date/Time: 10/21/24 1020    Scheduled providers: Bogdan Porras MD    Procedure: EGD    Location: [facility]            Relevant Problems   GI   (+) Gastroesophageal reflux disease without esophagitis      Endocrine   (+) Class 1 obesity due to excess calories with body mass index (BMI) of 34.0 to 34.9 in adult       Clinical information reviewed:   Tobacco  Allergies  Meds   Med Hx  Surg Hx   Fam Hx  Soc Hx        NPO Detail:  No data recorded     Physical Exam    Airway  Mallampati: II  TM distance: >3 FB  Neck ROM: limited     Cardiovascular - normal exam     Dental    Pulmonary - normal exam     Abdominal - normal exam         Anesthesia Plan    History of general anesthesia?: yes  History of complications of general anesthesia?: no    ASA 2     MAC     intravenous induction   Anesthetic plan and risks discussed with patient.

## 2024-10-21 NOTE — H&P
History Of Present Illness  Kyle Lira is a 24 y.o. male presenting with GERD on PPI     Past Medical History  Past Medical History:   Diagnosis Date    GERD (gastroesophageal reflux disease)      Surgical History  History reviewed. No pertinent surgical history.  Social History  He reports that he has never smoked. He has never used smokeless tobacco. He reports current alcohol use. He reports that he does not currently use drugs.    Family History  Family History   Problem Relation Name Age of Onset    Diabetes Father          Allergies  No Known Allergies  Review of Systems   Constitutional:  Negative for chills, fever and unexpected weight change.   HENT:  Negative for congestion and trouble swallowing.    Respiratory:  Negative for cough, shortness of breath and wheezing.    Cardiovascular:  Negative for chest pain.   Gastrointestinal:  Negative for abdominal distention, abdominal pain, constipation, diarrhea, nausea and vomiting.   Genitourinary:  Negative for difficulty urinating.   Musculoskeletal:  Negative for arthralgias and joint swelling.   Skin:  Negative for color change.   Neurological:  Negative for dizziness, speech difficulty, light-headedness and headaches.   Psychiatric/Behavioral:  Negative for confusion and sleep disturbance.         Physical Exam  Constitutional:       General: He is awake.      Appearance: Normal appearance.   HENT:      Head: Normocephalic and atraumatic.      Nose: Nose normal.      Mouth/Throat:      Mouth: Mucous membranes are moist.   Eyes:      Pupils: Pupils are equal, round, and reactive to light.   Neck:      Thyroid: No thyroid mass.      Trachea: Phonation normal.   Cardiovascular:      Rate and Rhythm: Normal rate and regular rhythm.      Heart sounds: Normal heart sounds. No murmur heard.     No gallop.   Pulmonary:      Effort: Pulmonary effort is normal. No respiratory distress.      Breath sounds: Normal air entry. No decreased breath sounds, wheezing,  "rhonchi or rales.   Abdominal:      General: Bowel sounds are normal. There is no distension.      Palpations: Abdomen is soft.      Tenderness: There is no abdominal tenderness.   Musculoskeletal:      Cervical back: Neck supple.      Right lower leg: No edema.      Left lower leg: No edema.   Skin:     General: Skin is warm.      Capillary Refill: Capillary refill takes less than 2 seconds.   Neurological:      General: No focal deficit present.      Mental Status: He is alert and oriented to person, place, and time. Mental status is at baseline.      Cranial Nerves: Cranial nerves 2-12 are intact.      Motor: Motor function is intact.   Psychiatric:         Attention and Perception: Attention and perception normal.         Mood and Affect: Mood normal.         Speech: Speech normal.         Behavior: Behavior normal.          Last Recorded Vitals  Blood pressure 134/79, pulse 56, temperature 36 °C (96.8 °F), temperature source Temporal, resp. rate 20, height 1.88 m (6' 2\"), weight 102 kg (225 lb), SpO2 99%.    Assessment/Plan   GERD on PPI   Not able to wean off.   EGD     Bogdan Porras MD  "

## 2024-10-21 NOTE — DISCHARGE INSTRUCTIONS

## 2024-10-21 NOTE — ANESTHESIA POSTPROCEDURE EVALUATION
Patient: Kyle Lira    Procedure Summary       Date: 10/21/24 Room / Location: [facility]    Anesthesia Start: 1013 Anesthesia Stop: 1025    Procedure: EGD Diagnosis:       Gastroesophageal reflux disease without esophagitis      Gastroesophageal reflux disease without esophagitis    Scheduled Providers: Bogdan Porras MD Responsible Provider: LINNEA Tariq    Anesthesia Type: MAC ASA Status: 2            Anesthesia Type: MAC    Vitals Value Taken Time   /60 10/21/24 1023   Temp 36.7 °C (98.1 °F) 10/21/24 1023   Pulse 72 10/21/24 1023   Resp 13 10/21/24 1023   SpO2 98 % 10/21/24 1023       Anesthesia Post Evaluation    Patient location during evaluation: PACU  Patient participation: waiting for patient participation  Level of consciousness: responsive to verbal stimuli  Pain score: 0  Pain management: adequate  Airway patency: patent  Cardiovascular status: blood pressure returned to baseline  Respiratory status: acceptable  Hydration status: acceptable  Postoperative Nausea and Vomiting: none    No notable events documented.

## 2024-10-22 PROBLEM — K21.00 GASTROESOPHAGEAL REFLUX DISEASE WITH ESOPHAGITIS WITHOUT HEMORRHAGE: Status: ACTIVE | Noted: 2024-03-18

## 2024-10-22 PROBLEM — K21.00 HIATAL HERNIA WITH GERD AND ESOPHAGITIS: Status: ACTIVE | Noted: 2024-10-22

## 2024-10-22 PROBLEM — K44.9 HIATAL HERNIA WITH GERD AND ESOPHAGITIS: Status: ACTIVE | Noted: 2024-10-22

## 2024-10-25 LAB
LABORATORY COMMENT REPORT: NORMAL
PATH REPORT.FINAL DX SPEC: NORMAL
PATH REPORT.GROSS SPEC: NORMAL
PATH REPORT.TOTAL CANCER: NORMAL

## 2024-11-01 NOTE — RESULT ENCOUNTER NOTE
Pathology results from recent upper endoscopy are normal.  Which means no infection was identified.  Sincerely,